# Patient Record
Sex: FEMALE | Race: WHITE | NOT HISPANIC OR LATINO | Employment: OTHER | URBAN - METROPOLITAN AREA
[De-identification: names, ages, dates, MRNs, and addresses within clinical notes are randomized per-mention and may not be internally consistent; named-entity substitution may affect disease eponyms.]

---

## 2017-11-11 ENCOUNTER — HOSPITAL ENCOUNTER (EMERGENCY)
Facility: MEDICAL CENTER | Age: 52
End: 2017-11-12
Attending: EMERGENCY MEDICINE
Payer: COMMERCIAL

## 2017-11-11 ENCOUNTER — APPOINTMENT (OUTPATIENT)
Dept: RADIOLOGY | Facility: MEDICAL CENTER | Age: 52
End: 2017-11-11
Attending: EMERGENCY MEDICINE
Payer: COMMERCIAL

## 2017-11-11 VITALS
DIASTOLIC BLOOD PRESSURE: 66 MMHG | WEIGHT: 110 LBS | HEART RATE: 73 BPM | BODY MASS INDEX: 15.75 KG/M2 | SYSTOLIC BLOOD PRESSURE: 119 MMHG | TEMPERATURE: 99.4 F | OXYGEN SATURATION: 100 % | RESPIRATION RATE: 20 BRPM | HEIGHT: 70 IN

## 2017-11-11 DIAGNOSIS — G89.18 POST-OPERATIVE PAIN: ICD-10-CM

## 2017-11-11 DIAGNOSIS — M25.511 ACUTE PAIN OF RIGHT SHOULDER: ICD-10-CM

## 2017-11-11 DIAGNOSIS — N39.0 ACUTE UTI: ICD-10-CM

## 2017-11-11 LAB
APPEARANCE UR: CLEAR
BACTERIA #/AREA URNS HPF: ABNORMAL /HPF
BASOPHILS # BLD AUTO: 0.3 % (ref 0–1.8)
BASOPHILS # BLD: 0.04 K/UL (ref 0–0.12)
BILIRUB UR QL STRIP.AUTO: NEGATIVE
COLOR UR: YELLOW
CRP SERPL HS-MCNC: 0.12 MG/DL (ref 0–0.75)
EOSINOPHIL # BLD AUTO: 0.33 K/UL (ref 0–0.51)
EOSINOPHIL NFR BLD: 2.9 % (ref 0–6.9)
EPI CELLS #/AREA URNS HPF: NEGATIVE /HPF
ERYTHROCYTE [DISTWIDTH] IN BLOOD BY AUTOMATED COUNT: 42.3 FL (ref 35.9–50)
ERYTHROCYTE [SEDIMENTATION RATE] IN BLOOD BY WESTERGREN METHOD: 18 MM/HOUR (ref 0–30)
GLUCOSE UR STRIP.AUTO-MCNC: NEGATIVE MG/DL
HCT VFR BLD AUTO: 40.5 % (ref 37–47)
HGB BLD-MCNC: 13.5 G/DL (ref 12–16)
HYALINE CASTS #/AREA URNS LPF: ABNORMAL /LPF
IMM GRANULOCYTES # BLD AUTO: 0.05 K/UL (ref 0–0.11)
IMM GRANULOCYTES NFR BLD AUTO: 0.4 % (ref 0–0.9)
KETONES UR STRIP.AUTO-MCNC: NEGATIVE MG/DL
LEUKOCYTE ESTERASE UR QL STRIP.AUTO: ABNORMAL
LYMPHOCYTES # BLD AUTO: 2.26 K/UL (ref 1–4.8)
LYMPHOCYTES NFR BLD: 19.6 % (ref 22–41)
MCH RBC QN AUTO: 31 PG (ref 27–33)
MCHC RBC AUTO-ENTMCNC: 33.3 G/DL (ref 33.6–35)
MCV RBC AUTO: 93.1 FL (ref 81.4–97.8)
MICRO URNS: ABNORMAL
MONOCYTES # BLD AUTO: 0.81 K/UL (ref 0–0.85)
MONOCYTES NFR BLD AUTO: 7 % (ref 0–13.4)
NEUTROPHILS # BLD AUTO: 8.04 K/UL (ref 2–7.15)
NEUTROPHILS NFR BLD: 69.8 % (ref 44–72)
NITRITE UR QL STRIP.AUTO: POSITIVE
NRBC # BLD AUTO: 0 K/UL
NRBC BLD AUTO-RTO: 0 /100 WBC
PH UR STRIP.AUTO: 6.5 [PH]
PLATELET # BLD AUTO: 337 K/UL (ref 164–446)
PMV BLD AUTO: 9.5 FL (ref 9–12.9)
PROT UR QL STRIP: NEGATIVE MG/DL
RBC # BLD AUTO: 4.35 M/UL (ref 4.2–5.4)
RBC # URNS HPF: ABNORMAL /HPF
RBC UR QL AUTO: ABNORMAL
SP GR UR STRIP.AUTO: 1.01
UROBILINOGEN UR STRIP.AUTO-MCNC: 0.2 MG/DL
WBC # BLD AUTO: 11.5 K/UL (ref 4.8–10.8)
WBC #/AREA URNS HPF: ABNORMAL /HPF

## 2017-11-11 PROCEDURE — 96372 THER/PROPH/DIAG INJ SC/IM: CPT

## 2017-11-11 PROCEDURE — 73221 MRI JOINT UPR EXTREM W/O DYE: CPT | Mod: RT

## 2017-11-11 PROCEDURE — 99284 EMERGENCY DEPT VISIT MOD MDM: CPT

## 2017-11-11 PROCEDURE — 700111 HCHG RX REV CODE 636 W/ 250 OVERRIDE (IP): Performed by: EMERGENCY MEDICINE

## 2017-11-11 PROCEDURE — 81001 URINALYSIS AUTO W/SCOPE: CPT

## 2017-11-11 PROCEDURE — 86140 C-REACTIVE PROTEIN: CPT

## 2017-11-11 PROCEDURE — 85652 RBC SED RATE AUTOMATED: CPT

## 2017-11-11 PROCEDURE — A9270 NON-COVERED ITEM OR SERVICE: HCPCS | Performed by: EMERGENCY MEDICINE

## 2017-11-11 PROCEDURE — 85025 COMPLETE CBC W/AUTO DIFF WBC: CPT

## 2017-11-11 PROCEDURE — 700102 HCHG RX REV CODE 250 W/ 637 OVERRIDE(OP): Performed by: EMERGENCY MEDICINE

## 2017-11-11 RX ORDER — OXYCODONE HYDROCHLORIDE 5 MG/1
10 TABLET ORAL ONCE
Status: COMPLETED | OUTPATIENT
Start: 2017-11-11 | End: 2017-11-11

## 2017-11-11 RX ORDER — KETOROLAC TROMETHAMINE 30 MG/ML
30 INJECTION, SOLUTION INTRAMUSCULAR; INTRAVENOUS ONCE
Status: COMPLETED | OUTPATIENT
Start: 2017-11-11 | End: 2017-11-11

## 2017-11-11 RX ORDER — KETOROLAC TROMETHAMINE 30 MG/ML
30 INJECTION, SOLUTION INTRAMUSCULAR; INTRAVENOUS ONCE
Status: DISCONTINUED | OUTPATIENT
Start: 2017-11-11 | End: 2017-11-11

## 2017-11-11 RX ADMIN — OXYCODONE HYDROCHLORIDE 10 MG: 5 TABLET ORAL at 22:26

## 2017-11-11 RX ADMIN — KETOROLAC TROMETHAMINE 30 MG: 30 INJECTION, SOLUTION INTRAMUSCULAR at 22:45

## 2017-11-12 NOTE — ED NOTES
Pt discharged from this ER as VSS, Pt is A&Ox4 leaves this ER with a steady gait. PT given all discharge instructions and education with understanding and questions answered. Pt instructed to return to ER ot call 911 if symptoms worsen. Pt states feels safe to be discharged and has all belongings in hand.

## 2017-11-12 NOTE — ED NOTES
"Pt to triage  , pt refusing to be weighed , explained I needed a weight for medication safe dosing , pt states \" I won't be weighed\"  c/o rt shoulder pain , states \" she had a procedure done in [facility] on Thursday as an outpt \" by , \" has been in excruciating pain since, states \" she know she has an infection in her shoulder, requesting to have her labs started   "

## 2017-11-12 NOTE — ED PROVIDER NOTES
"ED Provider Note    Scribed for Tracy Moraes M.D. by Milagro Nino. 11/11/2017  9:31 PM    Means of arrival: Walk-in  History of obtained from: Patient  History limited by: None    CHIEF COMPLAINT  Chief Complaint   Patient presents with   • Shoulder Pain   • Post-Op Complications   • Fever       HPI  Fela Mckeon is a 52 y.o. female who presents to the Emergency Department for evaluation of shoulder pain, fever, and possible post-op complications onset two days ago. Per patient, she had a procedure done to remove a calcium deposit in her right shoulder in [facility] on 11/9/17. She reports that after the Lidocaine wore off, she was in severe \"child-birth\" pain. The patient states that she was given a prescription for 10 mg Norco which brought her pain down to a 5/10 in severity. Per patient, she got another prescription for 5 mg Norco this morning and took 2 which did not alleviate her symptoms. She reports that she went to [facility] prior to her arrival and had a fever of 101F after taking Tylenol so she was told to go the emergency department for possible infection in her joint. The patient reports taking 3 Percocet tablets around 2000. She currently rates her pain an 8/10 in severity. Per patient, she was also recently diagnosed with a urinary tract infection but has not started an antibiotic treatment yet.     REVIEW OF SYSTEMS  Pertinent positives include shoulder pain, fever. Pertinent negative include no radiating pain. All other systems are negative.   C.    PAST MEDICAL HISTORY   has a past medical history of Allergy and No pertinent past medical history.    SOCIAL HISTORY  Social History     Social History Main Topics   • Smoking status: Never Smoker   • Smokeless tobacco: Never Used   • Alcohol use No   • Drug use: No   • Sexual activity: Yes     Partners: Male     Birth control/ protection: Surgical       SURGICAL HISTORY  patient denies any surgical history    CURRENT MEDICATIONS  No " "current facility-administered medications on file prior to encounter.      Current Outpatient Prescriptions on File Prior to Encounter   Medication Sig Dispense Refill   • docusate sodium (COLACE) 100 MG Cap Take 1 Cap by mouth 4 times a day as needed for Constipation. 40 Cap 0   • oxycodone-acetaminophen (PERCOCET) 5-325 MG Tab Take 1-2 Tabs by mouth every four hours as needed. 30 Tab 0   • phenazopyridine (PYRIDIUM) 200 MG Tab Take 1 Tab by mouth 3 times a day as needed. 6 Tab 0   • ciprofloxacin (CIPRO) 500 MG Tab Take 1 Tab by mouth 2 times a day. 20 Tab 0   • hydrocodone/acetaminophen (NORCO)  MG Tab Take 1 Tab by mouth every 8 hours as needed. 10 Tab 0   • acyclovir (ZOVIRAX) 400 MG tablet Take 1 Tab by mouth as needed.  3     ALLERGIES  Allergies   Allergen Reactions   • Penicillins Unspecified     Patient informed by parents to never have PCN   • Tree Nuts Food Allergy Swelling   • Eggs Nausea       PHYSICAL EXAM  VITAL SIGNS: /66   Pulse 73   Temp 37.4 °C (99.4 °F)   Resp 20   Ht 1.778 m (5' 10\")   Wt 49.9 kg (110 lb) Comment: pt refused to be weighed   SpO2 100%   BMI 15.78 kg/m²    Constitutional: Alert in no apparent distress. Non toxic appearing.  HENT: Normocephalic, Atraumatic. Bilateral external ears normal. Nose normal. Moist mucous membranes.  Neck: Supple, full range of motion  Eyes: Pupils are equal and reactive. Conjunctiva normal.   Heart: Regular rate and rhythm. No murmurs.  2+ radial pulses bilaterally  Lungs: No respiratory distress.  Normal work of breathing.  Clear to auscultation bilaterally.  Abdomen:  Soft, no distention. No tenderness to palpation  Skin: Warm, Dry. No rash.   Musculoskeletal: Atraumatic, no deformities noted. Right shoulder with significant pain with any ROM and axial loading, small joint effusion noted. No warmth or erythema.  Neurologic: Alert and oriented. Moving all extremities spontaneously  Psychiatric: Affect normal, Mood normal. Appears " "appropriate and not intoxicated.     ED COURSE & MEDICAL DECISION MAKING    /66   Pulse 73   Temp 37.4 °C (99.4 °F)   Resp 20   Ht 1.778 m (5' 10\")   Wt 49.9 kg (110 lb) Comment: pt refused to be weighed   SpO2 100%   BMI 15.78 kg/m²     Medications administered:  Medications   oxycodone immediate-release (ROXICODONE) tablet 10 mg (10 mg Oral Given 11/11/17 2226)   ketorolac (TORADOL) injection 30 mg (30 mg Intramuscular Given 11/11/17 8664)         Labs and imaging personally reviewed:    LABS  Labs Reviewed   CBC WITH DIFFERENTIAL - Abnormal; Notable for the following:        Result Value    WBC 11.5 (*)     MCHC 33.3 (*)     Lymphocytes 19.60 (*)     Neutrophils (Absolute) 8.04 (*)     All other components within normal limits   URINALYSIS - Abnormal; Notable for the following:     Nitrite Positive (*)     Leukocyte Esterase Moderate (*)     Occult Blood Trace (*)     All other components within normal limits   URINE MICROSCOPIC (W/UA) - Abnormal; Notable for the following:     WBC 20-50 (*)     RBC 2-5 (*)     Bacteria Many (*)     All other components within normal limits   CRP QUANTITIVE (NON-CARDIAC)   WESTERGREN SED RATE       RADIOLOGY  MR-SHOULDER-W/O RIGHT   Final Result      1.  Low signal foci in the supraspinatus tendon are consistent with calcific tendinitis.      2.  No full-thickness tear or tendon retraction is identified.      3.  Mild to moderate degenerative change in the acromioclavicular joint.      4.  Small amount of fluid in the subacromial/subdeltoid bursa with mild inflammation in the surrounding soft tissues, likely related to recent surgery.      5.  No significant glenohumeral joint effusion.      6.  No abnormal marrow signal to suggest osteomyelitis.            Old records personally reviewed:   Patient has an ultrasound-guided lavage and aspiration of calcific tendinopathy on 11/9/17 completed in the office by Dr. Jeannette Rae.  Reviewed recent visit to [facility] where she was found to have temperature of 100.2.  UA consistent with UTI.  Told to go to ER to rule out septic arthritis.    MDM:    9:31 PM Patient seen and examined at bedside. The patient presents with shoulder pain. Ordered for right shoulder x-ray, CRP Quantitative, Westergren sed rate, CBC with differential to evaluate. Patient will be treated with 30 mg IV Toradol and 10 mg Roxicodone tablet for her symptoms.     10:01 PM Paged Orthopedics.    10:07 PM Spoke with Dr. Suarez, Orthopedics, about the patient's condition. He said that with this procedure, they rarely get into the glenohumeral joint so risk of septic arthritis is very low. He recommends an MRI of the shoulder as well as inflammatory markers. He states that if those are negative, we will not need to perform arthrocentesis.    Patient presents with right shoulder pain after recent minor procedure to remove calcium deposit 2 days prior.  Borderline fever at outside urgent care, however patient also with symptoms and urine sample consistent with UTI as well.  Patient is afebrile here, however does have significant pain with axial loading and range of motion.  After discussion with Dr. Suarez, plan for inflammatory markers and MRI, both of which are reassuring.  With normal CRP and no fluid in glenohumeral joint, my suspicion for septic arthritis is extremely low.  Plan to discharge home with continuation of pain medications as well as initiation of antibiotics provided at Urgent Care for UTI with close follow up with Dr. Rae in clinic on Monday.    11:55 PM Recheck: Patient re-evaluated at beside. Patient reports feeling improved. Discussed patient's condition and treatment plan. Patient's lab and radiology results discussed. The patient understood and is in agreement.     The patient will return for new or worsening symptoms and is stable at the time of discharge.    DISPOSITION:  Patient will be discharged home in stable condition.    FOLLOW  UP:  Haylee Chi M.D.  [address] [facility] [address] [phone]    Schedule an appointment as soon as possible for a visit      Alice Rae M.D.  [address] [facility] [address] [phone]    Schedule an appointment as soon as possible for a visit      [facility], Emergency Dept  [address] [facility] [address] [phone]    If symptoms worsen        IMPRESSION  (M25.511) Acute pain of right shoulder  (G89.18) Post-operative pain  (N39.0) Acute UTI    Results, diagnoses, and treatment options were discussed with the patient and/or family. Patient verbalized understanding of plan of care and strict return precautions prior to discharge.    Discharge Medication List as of 11/12/2017 12:55 AM               Milagro CAMARGO (Douglase), am scribing for, and in the presence of, Tracy Moraes M.D..    Electronically signed by: Milagro Nino (Vianey), 11/11/2017    Tracy CAMARGO M.D. personally performed the services described in this documentation, as scribed by Milagro Nino in my presence, and it is both accurate and complete.      The note accurately reflects work and decisions made by me.  Tracy Moraes  11/12/2017  2:28 PM

## 2017-11-12 NOTE — DISCHARGE INSTRUCTIONS
You were seen in the Emergency Department for shoulder pain.    Labs and MRI were completed without significant acute abnormalities. There was evidence of some inflammation which is likely related to her recent procedure. No evidence of joint effusion concerning for infection.    Please use 600mg of ibuprofen every 6 hours in addition to stronger pain medications as needed for severe pain.    Please follow up with your primary care physician as well as your orthopedic doctor as soon as possible.    Return to the Emergency Department with fevers greater than 100.4, worsening pain, redness or warmth to the joint, or other concerns.      Musculoskeletal Pain  Musculoskeletal pain is muscle and amelie aches and pains. These pains can occur in any part of the body. Your caregiver may treat you without knowing the cause of the pain. They may treat you if blood or urine tests, X-rays, and other tests were normal.   CAUSES  There is often not a definite cause or reason for these pains. These pains may be caused by a type of germ (virus). The discomfort may also come from overuse. Overuse includes working out too hard when your body is not fit. Amelie aches also come from weather changes. Bone is sensitive to atmospheric pressure changes.  HOME CARE INSTRUCTIONS   · Ask when your test results will be ready. Make sure you get your test results.  · Only take over-the-counter or prescription medicines for pain, discomfort, or fever as directed by your caregiver. If you were given medications for your condition, do not drive, operate machinery or power tools, or sign legal documents for 24 hours. Do not drink alcohol. Do not take sleeping pills or other medications that may interfere with treatment.  · Continue all activities unless the activities cause more pain. When the pain lessens, slowly resume normal activities. Gradually increase the intensity and duration of the activities or exercise.  · During periods of severe pain,  bed rest may be helpful. Lay or sit in any position that is comfortable.  · Putting ice on the injured area.  ¨ Put ice in a bag.  ¨ Place a towel between your skin and the bag.  ¨ Leave the ice on for 15 to 20 minutes, 3 to 4 times a day.  · Follow up with your caregiver for continued problems and no reason can be found for the pain. If the pain becomes worse or does not go away, it may be necessary to repeat tests or do additional testing. Your caregiver may need to look further for a possible cause.  SEEK IMMEDIATE MEDICAL CARE IF:  · You have pain that is getting worse and is not relieved by medications.  · You develop chest pain that is associated with shortness or breath, sweating, feeling sick to your stomach (nauseous), or throw up (vomit).  · Your pain becomes localized to the abdomen.  · You develop any new symptoms that seem different or that concern you.  MAKE SURE YOU:   · Understand these instructions.  · Will watch your condition.  · Will get help right away if you are not doing well or get worse.     This information is not intended to replace advice given to you by your health care provider. Make sure you discuss any questions you have with your health care provider.     Document Released: 12/18/2006 Document Revised: 03/11/2013 Document Reviewed: 08/22/2014  [employer] Interactive Patient Education ©2016 [employer]      Shoulder Pain  The shoulder is the joint that connects your arm to your body. Muscles and band-like tissues that connect bones to muscles (tendons) hold the joint together. Shoulder pain is felt if an injury or medical problem affects one or more parts of the shoulder.  HOME CARE   · Put ice on the sore area.  ¨ Put ice in a plastic bag.  ¨ Place a towel between your skin and the bag.  ¨ Leave the ice on for 15-20 minutes, 03-04 times a day for the first 2 days.  · Stop using cold packs if they do not help with the pain.  · If you were given something to keep your shoulder from  moving (sling; shoulder immobilizer), wear it as told. Only take it off to shower or bathe.  · Move your arm as little as possible, but keep your hand moving to prevent puffiness (swelling).  · Squeeze a soft ball or foam pad as much as possible to help prevent swelling.  · Take medicine as told by your doctor.  GET HELP IF:  · You have progressing new pain in your arm, hand, or fingers.  · Your hand or fingers get cold.  · Your medicine does not help lessen your pain.  GET HELP RIGHT AWAY IF:   · Your arm, hand, or fingers are numb or tingling.  · Your arm, hand, or fingers are puffy (swollen), painful, or turn white or blue.  MAKE SURE YOU:   · Understand these instructions.  · Will watch your condition.  · Will get help right away if you are not doing well or get worse.     This information is not intended to replace advice given to you by your health care provider. Make sure you discuss any questions you have with your health care provider.     Document Released: 06/05/2009 Document Revised: 01/08/2016 Document Reviewed: 04/11/2016  [employer] Interactive Patient Education ©2016 [employer]      Urinary Tract Infection  A urinary tract infection (UTI) can occur any place along the urinary tract. The tract includes the kidneys, ureters, bladder, and urethra. A type of germ called bacteria often causes a UTI. UTIs are often helped with antibiotic medicine.   HOME CARE   · If given, take antibiotics as told by your doctor. Finish them even if you start to feel better.  · Drink enough fluids to keep your pee (urine) clear or pale yellow.  · Avoid tea, drinks with caffeine, and bubbly (carbonated) drinks.  · Pee often. Avoid holding your pee in for a long time.  · Pee before and after having sex (intercourse).  · Wipe from front to back after you poop (bowel movement) if you are a woman. Use each tissue only once.  GET HELP RIGHT AWAY IF:   · You have back pain.  · You have lower belly (abdominal) pain.  · You have  chills.  · You feel sick to your stomach (nauseous).  · You throw up (vomit).  · Your burning or discomfort with peeing does not go away.  · You have a fever.  · Your symptoms are not better in 3 days.  MAKE SURE YOU:   · Understand these instructions.  · Will watch your condition.  · Will get help right away if you are not doing well or get worse.     This information is not intended to replace advice given to you by your health care provider. Make sure you discuss any questions you have with your health care provider.     Document Released: 06/05/2009 Document Revised: 01/08/2016 Document Reviewed: 07/18/2013  [employer] Interactive Patient Education ©2016 [employer]

## 2017-11-13 PROBLEM — R13.14 PHARYNGOESOPHAGEAL DYSPHAGIA: Status: ACTIVE | Noted: 2017-11-13

## 2020-01-21 PROBLEM — B97.7 HPV (HUMAN PAPILLOMA VIRUS) INFECTION: Status: ACTIVE | Noted: 2020-01-21

## 2020-02-08 ENCOUNTER — OFFICE VISIT (OUTPATIENT)
Dept: URGENT CARE | Facility: CLINIC | Age: 55
End: 2020-02-08
Payer: COMMERCIAL

## 2020-02-08 VITALS
TEMPERATURE: 98.2 F | WEIGHT: 113 LBS | OXYGEN SATURATION: 98 % | HEIGHT: 66 IN | SYSTOLIC BLOOD PRESSURE: 100 MMHG | RESPIRATION RATE: 20 BRPM | BODY MASS INDEX: 18.16 KG/M2 | DIASTOLIC BLOOD PRESSURE: 68 MMHG | HEART RATE: 72 BPM

## 2020-02-08 DIAGNOSIS — J06.9 URI WITH COUGH AND CONGESTION: ICD-10-CM

## 2020-02-08 PROCEDURE — 99203 OFFICE O/P NEW LOW 30 MIN: CPT | Performed by: PHYSICIAN ASSISTANT

## 2020-02-08 RX ORDER — CODEINE PHOSPHATE/GUAIFENESIN 10-100MG/5
5 LIQUID (ML) ORAL
Qty: 50 ML | Refills: 0 | Status: SHIPPED | OUTPATIENT
Start: 2020-02-08 | End: 2020-02-18

## 2020-02-09 NOTE — PROGRESS NOTES
Subjective:      Fela Mckeon is a 54 y.o. female who presents with Otalgia (Today after MRI ears pain .) and Cough (Couple days dry cough .)            HPI  54-year-old female presents to urgent care with new problem of left greater than right otalgia, cough, mild congestion, and generalized fatigue.  Denies sore throat, fevers, body aches, or associated shortness of breath or wheezing.  Patient denies sick contacts.  OTC cough medication with minimal symptomatic relief. Patient states cough is keeping her awake at night. Denies other associated aggravating or alleviating factors.       Review of Systems   Constitutional: Positive for malaise/fatigue. Negative for chills and fever.   HENT: Positive for congestion and ear pain. Negative for sore throat.    Eyes: Negative for pain and redness.   Respiratory: Positive for cough and sputum production. Negative for shortness of breath and wheezing.    Cardiovascular: Negative for chest pain and palpitations.   Gastrointestinal: Negative for nausea and vomiting.   Genitourinary: Negative for dysuria.   Musculoskeletal: Negative for myalgias and neck pain.   Skin: Negative for rash.   Neurological: Negative for dizziness and headaches.   Endo/Heme/Allergies: Negative for environmental allergies.       Past Medical History:   Diagnosis Date   • Allergy    • No pertinent past medical history      Current Outpatient Medications on File Prior to Visit   Medication Sig Dispense Refill   • valacyclovir (VALTREX) 1 GM Tab Take 1 Tab by mouth 2 times a day. 20 Tab 1   • nitrofurantoin monohyd macro (MACROBID) 100 MG Cap Take 1 Cap by mouth as needed. (Patient not taking: Reported on 2/8/2020) 30 Cap 2   • EPINEPHrine (EPIPEN) 0.3 MG/0.3ML Solution Auto-injector solution for injection 0.3 ML BY INTRAMUSCULAR ROUTE ONCE FOR 1 DOSE.  0   • lidocaine (LIDODERM) 5 % Patch Apply 1 Patch to skin as directed every 24 hours. (Patient not taking: Reported on 2/8/2020) 10 Patch 0     No  "current facility-administered medications on file prior to visit.      Allergies   Allergen Reactions   • Penicillins Unspecified     Patient informed by parents to never have PCN   • Tree Nuts Food Allergy Swelling   • Cephalexin      Swollen throat and swollen eyes   • Eggs Nausea   • Albumin Vomiting     Social History     Tobacco Use   • Smoking status: Never Smoker   • Smokeless tobacco: Never Used   Substance Use Topics   • Alcohol use: No      Objective:     /68   Pulse 72   Temp 36.8 °C (98.2 °F)   Resp 20   Ht 1.676 m (5' 6\")   Wt 51.3 kg (113 lb)   SpO2 98%   BMI 18.24 kg/m²      Physical Exam  Vitals signs reviewed.   Constitutional:       General: She is not in acute distress.     Appearance: Normal appearance. She is well-developed. She is not ill-appearing.   HENT:      Head: Normocephalic and atraumatic.      Right Ear: Tympanic membrane and ear canal normal.      Left Ear: Tympanic membrane and ear canal normal.      Nose: Mucosal edema and congestion present. No rhinorrhea.      Mouth/Throat:      Mouth: Mucous membranes are moist.      Pharynx: Oropharynx is clear. Posterior oropharyngeal erythema present. No oropharyngeal exudate.   Eyes:      Extraocular Movements: Extraocular movements intact.      Conjunctiva/sclera: Conjunctivae normal.   Neck:      Musculoskeletal: Normal range of motion and neck supple.   Cardiovascular:      Rate and Rhythm: Normal rate and regular rhythm.      Heart sounds: Normal heart sounds.   Pulmonary:      Effort: Pulmonary effort is normal. No respiratory distress.      Breath sounds: Normal breath sounds.   Musculoskeletal: Normal range of motion.   Skin:     General: Skin is warm and dry.      Findings: No rash.   Neurological:      General: No focal deficit present.      Mental Status: She is alert and oriented to person, place, and time.   Psychiatric:         Mood and Affect: Mood normal.         Behavior: Behavior normal.         Thought Content: " Thought content normal.         Judgment: Judgment normal.                 Assessment/Plan:     1. URI with cough and congestion  guaifenesin-codeine (TUSSI-ORGANIDIN NR) 100-10 MG/5ML syrup       Advised patient symptoms are most likely viral in etiology.  OTC cold medication and Tylenol/Motrin for symptomatic relief.  Drink plenty of fluids and rest.  PT should follow up with PCP in 1-2 days for re-evaluation if symptoms have not improved.  Discussed red flags and reasons to return to UC or ED.  Pt and/or family verbalized understanding of diagnosis and follow up instructions and was offered informational handout on diagnosis.  PT discharged.

## 2020-02-18 ASSESSMENT — ENCOUNTER SYMPTOMS
NAUSEA: 0
EYE REDNESS: 0
NECK PAIN: 0
EYE PAIN: 0
HEADACHES: 0
SPUTUM PRODUCTION: 1
VOMITING: 0
DIZZINESS: 0
FEVER: 0
SHORTNESS OF BREATH: 0
COUGH: 1
PALPITATIONS: 0
MYALGIAS: 0
SORE THROAT: 0
CHILLS: 0
WHEEZING: 0

## 2021-11-25 NOTE — ED NOTES
ERP back to bedside. Pt medicated per MAR. Pt to MRI, pt concerned for her belongings and brought them with her to MRI.    [facility]

## 2022-01-05 ENCOUNTER — APPOINTMENT (RX ONLY)
Dept: URBAN - NONMETROPOLITAN AREA CLINIC 1 | Facility: CLINIC | Age: 57
Setting detail: DERMATOLOGY
End: 2022-01-05

## 2022-01-05 DIAGNOSIS — Z12.83 ENCOUNTER FOR SCREENING FOR MALIGNANT NEOPLASM OF SKIN: ICD-10-CM

## 2022-01-05 DIAGNOSIS — L82.1 OTHER SEBORRHEIC KERATOSIS: ICD-10-CM

## 2022-01-05 DIAGNOSIS — L81.4 OTHER MELANIN HYPERPIGMENTATION: ICD-10-CM

## 2022-01-05 DIAGNOSIS — D22 MELANOCYTIC NEVI: ICD-10-CM

## 2022-01-05 DIAGNOSIS — Z41.9 ENCOUNTER FOR PROCEDURE FOR PURPOSES OTHER THAN REMEDYING HEALTH STATE, UNSPECIFIED: ICD-10-CM

## 2022-01-05 PROBLEM — D22.5 MELANOCYTIC NEVI OF TRUNK: Status: ACTIVE | Noted: 2022-01-05

## 2022-01-05 PROCEDURE — ? PRESCRIPTION

## 2022-01-05 PROCEDURE — ? COSMETIC CONSULTATION: GENERAL

## 2022-01-05 PROCEDURE — 99203 OFFICE O/P NEW LOW 30 MIN: CPT

## 2022-01-05 PROCEDURE — ? COUNSELING

## 2022-01-05 RX ADMIN — Medication 1: at 00:00

## 2022-01-05 ASSESSMENT — LOCATION SIMPLE DESCRIPTION DERM
LOCATION SIMPLE: RIGHT UPPER ARM
LOCATION SIMPLE: LEFT UPPER ARM
LOCATION SIMPLE: RIGHT UPPER BACK
LOCATION SIMPLE: LEFT UPPER BACK

## 2022-01-05 ASSESSMENT — LOCATION ZONE DERM
LOCATION ZONE: TRUNK
LOCATION ZONE: ARM

## 2022-01-05 ASSESSMENT — LOCATION DETAILED DESCRIPTION DERM
LOCATION DETAILED: LEFT LATERAL UPPER BACK
LOCATION DETAILED: RIGHT SUPERIOR MEDIAL UPPER BACK
LOCATION DETAILED: RIGHT ANTERIOR PROXIMAL UPPER ARM
LOCATION DETAILED: LEFT ANTERIOR PROXIMAL UPPER ARM
LOCATION DETAILED: RIGHT MEDIAL UPPER BACK

## 2022-01-05 NOTE — PROCEDURE: MIPS QUALITY
Quality 402: Tobacco Use And Help With Quitting Among Adolescents: Patient screened for tobacco and never smoked
Quality 130: Documentation Of Current Medications In The Medical Record: Current Medications Documented
Detail Level: Generalized
Quality 226: Preventive Care And Screening: Tobacco Use: Screening And Cessation Intervention: Patient screened for tobacco use and is an ex/non-smoker

## 2022-01-06 RX ORDER — H-QUINONE/TRETINOIN/HYDROCORT 4 %-0.025%
1 EMULSION (GRAM) TOPICAL QD
Qty: 1 | Refills: 3 | Status: ERX | COMMUNITY
Start: 2022-01-05

## 2023-03-07 PROBLEM — G89.29 CHRONIC BILATERAL LOW BACK PAIN WITH BILATERAL SCIATICA: Status: ACTIVE | Noted: 2023-03-07

## 2023-03-07 PROBLEM — M51.36 BULGING LUMBAR DISC: Status: ACTIVE | Noted: 2023-03-07

## 2023-03-07 PROBLEM — M54.41 CHRONIC BILATERAL LOW BACK PAIN WITH BILATERAL SCIATICA: Status: ACTIVE | Noted: 2023-03-07

## 2023-03-07 PROBLEM — K20.0 EOSINOPHILIC ESOPHAGITIS: Status: ACTIVE | Noted: 2023-03-07

## 2023-03-07 PROBLEM — M25.531 RIGHT WRIST PAIN: Status: ACTIVE | Noted: 2023-03-07

## 2023-03-07 PROBLEM — B97.7 HPV (HUMAN PAPILLOMA VIRUS) INFECTION: Status: RESOLVED | Noted: 2020-01-21 | Resolved: 2023-03-07

## 2023-03-07 PROBLEM — M54.42 CHRONIC BILATERAL LOW BACK PAIN WITH BILATERAL SCIATICA: Status: ACTIVE | Noted: 2023-03-07

## 2023-03-07 PROBLEM — S46.012A TRAUMATIC INCOMPLETE TEAR OF LEFT ROTATOR CUFF: Status: ACTIVE | Noted: 2023-03-07

## 2023-07-18 ENCOUNTER — APPOINTMENT (RX ONLY)
Dept: URBAN - NONMETROPOLITAN AREA CLINIC 1 | Facility: CLINIC | Age: 58
Setting detail: DERMATOLOGY
End: 2023-07-18

## 2023-07-18 DIAGNOSIS — D22 MELANOCYTIC NEVI: ICD-10-CM

## 2023-07-18 DIAGNOSIS — Z71.89 OTHER SPECIFIED COUNSELING: ICD-10-CM

## 2023-07-18 DIAGNOSIS — L82.0 INFLAMED SEBORRHEIC KERATOSIS: ICD-10-CM

## 2023-07-18 DIAGNOSIS — Z41.9 ENCOUNTER FOR PROCEDURE FOR PURPOSES OTHER THAN REMEDYING HEALTH STATE, UNSPECIFIED: ICD-10-CM

## 2023-07-18 DIAGNOSIS — L82.1 OTHER SEBORRHEIC KERATOSIS: ICD-10-CM

## 2023-07-18 DIAGNOSIS — L81.4 OTHER MELANIN HYPERPIGMENTATION: ICD-10-CM

## 2023-07-18 PROBLEM — D22.5 MELANOCYTIC NEVI OF TRUNK: Status: ACTIVE | Noted: 2023-07-18

## 2023-07-18 PROCEDURE — 17110 DESTRUCTION B9 LES UP TO 14: CPT

## 2023-07-18 PROCEDURE — 99213 OFFICE O/P EST LOW 20 MIN: CPT | Mod: 25

## 2023-07-18 PROCEDURE — ? LIQUID NITROGEN

## 2023-07-18 PROCEDURE — ? COSMETIC CONSULTATION: GENERAL

## 2023-07-18 PROCEDURE — ? COUNSELING

## 2023-07-18 PROCEDURE — ? SUNSCREEN RECOMMENDATIONS

## 2023-07-18 ASSESSMENT — LOCATION DETAILED DESCRIPTION DERM
LOCATION DETAILED: SUPERIOR THORACIC SPINE
LOCATION DETAILED: RIGHT ANTERIOR AXILLA
LOCATION DETAILED: INFERIOR MID FOREHEAD
LOCATION DETAILED: LEFT INFERIOR MEDIAL UPPER BACK

## 2023-07-18 ASSESSMENT — LOCATION ZONE DERM
LOCATION ZONE: FACE
LOCATION ZONE: TRUNK
LOCATION ZONE: AXILLAE

## 2023-07-18 ASSESSMENT — LOCATION SIMPLE DESCRIPTION DERM
LOCATION SIMPLE: LEFT UPPER BACK
LOCATION SIMPLE: UPPER BACK
LOCATION SIMPLE: INFERIOR FOREHEAD
LOCATION SIMPLE: RIGHT ANTERIOR AXILLA

## 2023-07-18 NOTE — HPI: FULL BODY SKIN EXAMINATION
What Is The Reason For Today's Visit?: Full Body Skin Examination
What Is The Reason For Today's Visit? (Being Monitored For X): concerning skin lesions on an annual basis
Additional History: She would also like to discuss cosmetic fillers and Botox.

## 2023-09-25 PROBLEM — M48.062 SPINAL STENOSIS OF LUMBAR REGION WITH NEUROGENIC CLAUDICATION: Status: ACTIVE | Noted: 2023-09-25

## 2024-04-05 ENCOUNTER — RESEARCH ENCOUNTER (OUTPATIENT)
Dept: RESEARCH | Facility: MEDICAL CENTER | Age: 59
End: 2024-04-05